# Patient Record
Sex: MALE
[De-identification: names, ages, dates, MRNs, and addresses within clinical notes are randomized per-mention and may not be internally consistent; named-entity substitution may affect disease eponyms.]

---

## 2022-05-11 PROBLEM — Z00.00 ENCOUNTER FOR PREVENTIVE HEALTH EXAMINATION: Status: ACTIVE | Noted: 2022-05-11

## 2022-05-12 ENCOUNTER — APPOINTMENT (OUTPATIENT)
Dept: ORTHOPEDIC SURGERY | Facility: CLINIC | Age: 62
End: 2022-05-12
Payer: OTHER MISCELLANEOUS

## 2022-05-12 VITALS — WEIGHT: 250 LBS | HEIGHT: 71 IN | BODY MASS INDEX: 35 KG/M2

## 2022-05-12 PROCEDURE — 99072 ADDL SUPL MATRL&STAF TM PHE: CPT

## 2022-05-12 PROCEDURE — 99213 OFFICE O/P EST LOW 20 MIN: CPT

## 2022-05-12 NOTE — DISCUSSION/SUMMARY
[de-identified] : Awaiting auth for visco injection.\par He is a chronic opioid user, has been unable to find an outside pain management so far. Will renew Rx one last time today, and he was given a list of outside referrals.

## 2022-05-12 NOTE — PHYSICAL EXAM
[Right] : right foot and ankle [NL (40)] : plantar flexion 40 degrees [NL 30)] : inversion 30 degrees [NL (20)] : eversion 20 degrees [5___] : eversion 5[unfilled]/5 [2+] : dorsalis pedis pulse: 2+ [] : no lisfranc's joint tenderness [TWNoteComboBox7] : dorsiflexion 15 degrees

## 2022-05-12 NOTE — HISTORY OF PRESENT ILLNESS
[Work related] : work related [7] : 7 [4] : 4 [Dull/Aching] : dull/aching [Localized] : localized [Leisure] : leisure [Social interactions] : social interactions [Rest] : rest [Sitting] : sitting [Standing] : standing [Walking] : walking [Stairs] : stairs [de-identified] : WC 6/11/14 R ankle twisted @ work. [facility] now on crutches. No prior injury\par 06/26/14: Pt here for f/u R ankle sprain. Continued pain, slight improvement. Swelling. Has not been elevating.\par 7/15/14 f/u R ankle nwb in cam boot\par 8/12/14 f/u R ankle nwb in cam boot\par 10/23/14: f/u R ankle. Ankle pain persists. Did discuss surgery at last visit but does not feel ready for this. Has been\par authorized for viscosupplementation of the R ankle. Working full time.\par 11/4/14: Supartz #1 RT ankle.\par 11/11/14: Supartz #2 R ankle.\par 11/18/14: Supartz #3 R ankle.\par 11/25/14: Supartz #4 R ankle.\par 12/2/14: Supartz #5 R ankle.\par 1/13/15 f/u R ankle considering rtw 1/19/15\par 1/14/16: f/u R ankle. still pain in the ankle. presents for [facility].\par 1/9/18 f/u R ankle pain w/activities Working\par 12/30/19 F/U R ankle Pain w/activities Working\par 5/12/22: F/U R ankle; pain worsening, wants to discuss inj. Requesting meds. Working.  [] : Post Surgical Visit: no [FreeTextEntry1] : R ankle [FreeTextEntry3] :  6/11/14 [FreeTextEntry9] : injections

## 2022-05-12 NOTE — WORK
[Are consistent with the injury] : are consistent with the injury [Consistent with my objective findings] : consistent with my objective findings [Does not reveal pre-existing condition(s) that may affect treatment/prognosis] : does not reveal pre-existing condition(s) that may affect treatment/prognosis [Can return to work without limitations on ______] : can return to work without limitations on [unfilled] [Rx may affect patient's ability to return to work, make patient drowsy, or other issue] : Rx may affect patient's ability to return to work, make patient drowsy, or other issue. [I provided the services listed above] :  I provided the services listed above.

## 2022-06-29 ENCOUNTER — FORM ENCOUNTER (OUTPATIENT)
Age: 62
End: 2022-06-29

## 2022-10-06 ENCOUNTER — APPOINTMENT (OUTPATIENT)
Dept: ORTHOPEDIC SURGERY | Facility: CLINIC | Age: 62
End: 2022-10-06

## 2022-10-28 ENCOUNTER — APPOINTMENT (OUTPATIENT)
Dept: ORTHOPEDIC SURGERY | Facility: CLINIC | Age: 62
End: 2022-10-28

## 2022-10-28 VITALS — WEIGHT: 250 LBS | HEIGHT: 71 IN | BODY MASS INDEX: 35 KG/M2

## 2022-10-28 DIAGNOSIS — Z78.9 OTHER SPECIFIED HEALTH STATUS: ICD-10-CM

## 2022-10-28 PROCEDURE — 99213 OFFICE O/P EST LOW 20 MIN: CPT | Mod: 25

## 2022-10-28 PROCEDURE — 20606 DRAIN/INJ JOINT/BURSA W/US: CPT | Mod: RT

## 2022-10-28 PROCEDURE — 99072 ADDL SUPL MATRL&STAF TM PHE: CPT

## 2022-10-28 NOTE — WORK
[Partial] : partial [Can return to work without limitations on ______] : can return to work without limitations on [unfilled] [Rx may affect patient's ability to return to work, make patient drowsy, or other issue] : Rx may affect patient's ability to return to work, make patient drowsy, or other issue. [I provided the services listed above] :  I provided the services listed above.

## 2022-10-28 NOTE — PROCEDURE
[Medium Joint Injection] : medium joint injection [Right] : of the right [Ankle Joint] : ankle joint [Pain] : pain [Inflammation] : inflammation [Repeat series performed] : repeat series performed [Alcohol] : alcohol [Ethyl Chloride sprayed topically] : ethyl chloride sprayed topically [Sterile technique used] : sterile technique used [Orthovisc] : Orthovisc [#1] : series #1 [Apply ice for 15min out of every hour for the next 12-24 hours as tolerated] : apply ice for 15 minutes out of every hour for the next 12-24 hours as tolerated

## 2022-10-28 NOTE — PHYSICAL EXAM
[Right] : right foot and ankle [NL (40)] : plantar flexion 40 degrees [NL 30)] : inversion 30 degrees [NL (20)] : eversion 20 degrees [5___] : eversion 5[unfilled]/5 [2+] : dorsalis pedis pulse: 2+ [] : no calf tenderness [TWNoteComboBox7] : dorsiflexion 15 degrees

## 2022-10-28 NOTE — HISTORY OF PRESENT ILLNESS
[Work related] : work related [7] : 7 [4] : 4 [Dull/Aching] : dull/aching [Localized] : localized [Leisure] : leisure [Social interactions] : social interactions [Rest] : rest [Sitting] : sitting [Standing] : standing [Walking] : walking [Stairs] : stairs [Full time] : Work status: full time [de-identified] : WC 6/11/14 R ankle twisted @ work. Seen @  [facility]ospital now on crutches. No prior injury\par 06/26/14: Pt here for f/u R ankle sprain. Continued pain, slight improvement. Swelling. Has not been elevating.\par 7/15/14 f/u R ankle nwb in cam boot\par 8/12/14 f/u R ankle nwb in cam boot\par 10/23/14: f/u R ankle. Ankle pain persists. Did discuss surgery at last visit but does not feel ready for this. Has been\par authorized for viscosupplementation of the R ankle. Working full time.\par 11/4/14: Supartz #1 RT ankle.\par 11/11/14: Supartz #2 R ankle.\par 11/18/14: Supartz #3 R ankle.\par 11/25/14: Supartz #4 R ankle.\par 12/2/14: Supartz #5 R ankle.\par 1/13/15 f/u R ankle considering rtw 1/19/15\par 1/14/16: f/u R ankle. still pain in the ankle. presents for [facility].\par 1/9/18 f/u R ankle pain w/activities Working\par 12/30/19 F/U R ankle Pain w/activities Working\par 5/12/22: F/U R ankle; pain worsening, wants to discuss inj. Requesting meds. Working. 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

## 2022-11-15 ENCOUNTER — APPOINTMENT (OUTPATIENT)
Dept: ORTHOPEDIC SURGERY | Facility: CLINIC | Age: 62
End: 2022-11-15

## 2022-11-15 PROCEDURE — 99212 OFFICE O/P EST SF 10 MIN: CPT | Mod: 25

## 2022-11-15 PROCEDURE — 20606 DRAIN/INJ JOINT/BURSA W/US: CPT | Mod: 50

## 2022-11-15 PROCEDURE — 99072 ADDL SUPL MATRL&STAF TM PHE: CPT

## 2022-11-15 NOTE — PROCEDURE
[Medium Joint Injection] : medium joint injection [Right] : of the right [Ankle Joint] : ankle joint [Pain] : pain [Inflammation] : inflammation [Repeat series performed] : repeat series performed [Alcohol] : alcohol [Ethyl Chloride sprayed topically] : ethyl chloride sprayed topically [Sterile technique used] : sterile technique used [Orthovisc] : Orthovisc [#2] : series #2 [Call if redness, pain or fever occur] : call if redness, pain or fever occur [Apply ice for 15min out of every hour for the next 12-24 hours as tolerated] : apply ice for 15 minutes out of every hour for the next 12-24 hours as tolerated [Patient was advised to rest the joint(s) for ____ days] : patient was advised to rest the joint(s) for [unfilled] days [Previous OTC use and PT nontherapeutic] : patient has tried OTC's including aspirin, Ibuprofen, Aleve, etc or prescription NSAIDS, and/or exercises at home and/or physical therapy without satisfactory response [Patient had decreased mobility in the joint] : patient had decreased mobility in the joint [Risks, benefits, alternatives discussed / Verbal consent obtained] : the risks benefits, and alternatives have been discussed, and verbal consent was obtained [All ultrasound images have been permanently captured and stored accordingly in our picture archiving and communication system] : All ultrasound images have been permanently captured and stored accordingly in our picture archiving and communication system [Visualization of the needle and placement of injection was performed without complication] : visualization of the needle and placement of injection was performed without complication

## 2022-11-15 NOTE — HISTORY OF PRESENT ILLNESS
[Work related] : work related [7] : 7 [4] : 4 [Dull/Aching] : dull/aching [Localized] : localized [Leisure] : leisure [Social interactions] : social interactions [Rest] : rest [Sitting] : sitting [Standing] : standing [Walking] : walking [Stairs] : stairs [Full time] : Work status: full time [de-identified] : WC 6/11/14 R ankle twisted @ work. Seen @  [facility]ospital now on crutches. No prior injury\par 06/26/14: Pt here for f/u R ankle sprain. Continued pain, slight improvement. Swelling. Has not been elevating.\par 7/15/14 f/u R ankle nwb in cam boot\par 8/12/14 f/u R ankle nwb in cam boot\par 10/23/14: f/u R ankle. Ankle pain persists. Did discuss surgery at last visit but does not feel ready for this. Has been\par authorized for viscosupplementation of the R ankle. Working full time.\par 11/4/14: Supartz #1 RT ankle.\par 11/11/14: Supartz #2 R ankle.\par 11/18/14: Supartz #3 R ankle.\par 11/25/14: Supartz #4 R ankle.\par 12/2/14: Supartz #5 R ankle.\par 1/13/15 f/u R ankle considering rtw 1/19/15\par 1/14/16: f/u R ankle. still pain in the ankle. presents for [facility].\par 1/9/18 f/u R ankle pain w/activities Working\par 12/30/19 F/U R ankle Pain w/activities Working\par 5/12/22: F/U R ankle; pain worsening, wants to discuss inj. Requesting meds. Working. 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

## 2022-11-29 ENCOUNTER — APPOINTMENT (OUTPATIENT)
Dept: ORTHOPEDIC SURGERY | Facility: CLINIC | Age: 62
End: 2022-11-29

## 2022-11-29 PROCEDURE — 99072 ADDL SUPL MATRL&STAF TM PHE: CPT

## 2022-11-29 PROCEDURE — 20606 DRAIN/INJ JOINT/BURSA W/US: CPT | Mod: RT

## 2022-11-29 PROCEDURE — 99213 OFFICE O/P EST LOW 20 MIN: CPT | Mod: 25

## 2022-11-29 NOTE — PROCEDURE
[Medium Joint Injection] : medium joint injection [Right] : of the right [Ankle Joint] : ankle joint [Pain] : pain [Inflammation] : inflammation [Repeat series performed] : repeat series performed [Alcohol] : alcohol [Ethyl Chloride sprayed topically] : ethyl chloride sprayed topically [Sterile technique used] : sterile technique used [Call if redness, pain or fever occur] : call if redness, pain or fever occur [Apply ice for 15min out of every hour for the next 12-24 hours as tolerated] : apply ice for 15 minutes out of every hour for the next 12-24 hours as tolerated [Patient was advised to rest the joint(s) for ____ days] : patient was advised to rest the joint(s) for [unfilled] days [Previous OTC use and PT nontherapeutic] : patient has tried OTC's including aspirin, Ibuprofen, Aleve, etc or prescription NSAIDS, and/or exercises at home and/or physical therapy without satisfactory response [Patient had decreased mobility in the joint] : patient had decreased mobility in the joint [Risks, benefits, alternatives discussed / Verbal consent obtained] : the risks benefits, and alternatives have been discussed, and verbal consent was obtained [All ultrasound images have been permanently captured and stored accordingly in our picture archiving and communication system] : All ultrasound images have been permanently captured and stored accordingly in our picture archiving and communication system [Visualization of the needle and placement of injection was performed without complication] : visualization of the needle and placement of injection was performed without complication [Orthovisc (30mg)] : 30mg of Orthovisc [#4] : series #4

## 2022-11-29 NOTE — HISTORY OF PRESENT ILLNESS
[Work related] : work related [7] : 7 [4] : 4 [Dull/Aching] : dull/aching [Localized] : localized [Leisure] : leisure [Social interactions] : social interactions [Rest] : rest [Sitting] : sitting [Standing] : standing [Walking] : walking [Stairs] : stairs [Full time] : Work status: full time [de-identified] : WC 6/11/14 R ankle twisted @ work. Seen @  Hospital now on crutches. No prior injury\par 06/26/14: Pt here for f/u R ankle sprain. Continued pain, slight improvement. Swelling. Has not been elevating.\par 7/15/14 f/u R ankle nwb in cam boot\par 8/12/14 f/u R ankle nwb in cam boot\par 10/23/14: f/u R ankle. Ankle pain persists. Did discuss surgery at last visit but does not feel ready for this. Has been\par authorized for viscosupplementation of the R ankle. Working full time.\par 11/4/14: Supartz #1 RT ankle.\par 11/11/14: Supartz #2 R ankle.\par 11/18/14: Supartz #3 R ankle.\par 11/25/14: Supartz #4 R ankle.\par 12/2/14: Supartz #5 R ankle.\par 1/13/15 f/u R ankle considering rtw 1/19/15\par 1/14/16: f/u R ankle. still pain in the ankle. presents for [facility].\par 1/9/18 f/u R ankle pain w/activities Working\par 12/30/19 F/U R ankle Pain w/activities Working\par 5/12/22: F/U R ankle; pain worsening, wants to discuss inj. Requesting meds. Working. 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

## 2022-12-19 ENCOUNTER — FORM ENCOUNTER (OUTPATIENT)
Age: 62
End: 2022-12-19

## 2023-01-15 ENCOUNTER — FORM ENCOUNTER (OUTPATIENT)
Age: 63
End: 2023-01-15

## 2023-02-20 ENCOUNTER — FORM ENCOUNTER (OUTPATIENT)
Age: 63
End: 2023-02-20

## 2023-03-27 ENCOUNTER — FORM ENCOUNTER (OUTPATIENT)
Age: 63
End: 2023-03-27

## 2023-04-18 ENCOUNTER — APPOINTMENT (OUTPATIENT)
Dept: ORTHOPEDIC SURGERY | Facility: CLINIC | Age: 63
End: 2023-04-18
Payer: OTHER MISCELLANEOUS

## 2023-04-18 PROCEDURE — 99213 OFFICE O/P EST LOW 20 MIN: CPT

## 2023-04-18 NOTE — HISTORY OF PRESENT ILLNESS
[Work related] : work related [7] : 7 [4] : 4 [Dull/Aching] : dull/aching [Localized] : localized [Leisure] : leisure [Social interactions] : social interactions [Rest] : rest [Sitting] : sitting [Standing] : standing [Walking] : walking [Stairs] : stairs [Full time] : Work status: full time [de-identified] : WC 6/11/14 R ankle twisted @ work. Seen @  [facility]ospital now on crutches. No prior injury\par 06/26/14: Pt here for f/u R ankle sprain. Continued pain, slight improvement. Swelling. Has not been elevating.\par 7/15/14 f/u R ankle nwb in cam boot\par 8/12/14 f/u R ankle nwb in cam boot\par 10/23/14: f/u R ankle. Ankle pain persists. Did discuss surgery at last visit but does not feel ready for this. Has been\par authorized for viscosupplementation of the R ankle. Working full time.\par 11/4/14: Supartz #1 RT ankle.\par 11/11/14: Supartz #2 R ankle.\par 11/18/14: Supartz #3 R ankle.\par 11/25/14: Supartz #4 R ankle.\par 12/2/14: Supartz #5 R ankle.\par 1/13/15 f/u R ankle considering rtw 1/19/15\par 1/14/16: f/u R ankle. still pain in the ankle. Presents for [facility].\par 1/9/18 f/u R ankle pain w/activities Working\par 12/30/19 F/U R ankle Pain w/activities Working\par 5/12/22: F/U R ankle; pain worsening, wants to discuss inj. Requesting meds. Working. \par 10/28/22: fu R ankle Orthovisc #1  Working requ meds\par 11/15/22: F/U R ankle Orthovisc #2\par 11/22/22  Orthovisc #3 R ankle\par 11/29/22: f/u R ankle Orthovisc #4\par \par 4/18/23: F/U R ankle- Reports feeling worse with new knee pain. No recent trauma.  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

## 2023-05-10 ENCOUNTER — FORM ENCOUNTER (OUTPATIENT)
Age: 63
End: 2023-05-10

## 2023-06-11 ENCOUNTER — FORM ENCOUNTER (OUTPATIENT)
Age: 63
End: 2023-06-11

## 2023-06-14 ENCOUNTER — FORM ENCOUNTER (OUTPATIENT)
Age: 63
End: 2023-06-14

## 2023-07-05 ENCOUNTER — FORM ENCOUNTER (OUTPATIENT)
Age: 63
End: 2023-07-05

## 2023-09-07 ENCOUNTER — APPOINTMENT (OUTPATIENT)
Dept: ORTHOPEDIC SURGERY | Facility: CLINIC | Age: 63
End: 2023-09-07
Payer: OTHER MISCELLANEOUS

## 2023-09-07 DIAGNOSIS — S46.011S STRAIN OF MUSCLE(S) AND TENDON(S) OF THE ROTATOR CUFF OF RIGHT SHOULDER, SEQUELA: ICD-10-CM

## 2023-09-07 DIAGNOSIS — M25.811 OTHER SPECIFIED JOINT DISORDERS, RIGHT SHOULDER: ICD-10-CM

## 2023-09-07 PROCEDURE — 99213 OFFICE O/P EST LOW 20 MIN: CPT

## 2023-09-07 PROCEDURE — 73030 X-RAY EXAM OF SHOULDER: CPT | Mod: RT

## 2023-09-07 NOTE — HISTORY OF PRESENT ILLNESS
[9] : 9 [Dull/Aching] : dull/aching [de-identified] :  DOI 4/19/18 4/24/18: R shoulder and low back pain after fall down the steps at work. Pain with movement of the shoulder. Right sided low back pain, no radicular complaints. No numbness/tingling. No prior injuries. OOW 5/15/18: f/u R shoulder, continued pain. Here to review the MRI. 6/12/18 f/u R shoulder HEP OOW 7/2/18: f/u R shoulder, HEP requesting meds 9/4/18: f/u R shoulder; continued pain. requesting meds 11/20/18 f/u R shoulder HEP Working reg 2/22/19 f/u R shoulder pain w/overhead activities HEP 3/28/19: f/u R shoulder, continued pain, HEP, requesting meds. working 7/30/19 f/u R shoulder HEP, PT not authorized working requ meds 10/1/19 f/u R shoulder HEP, working 7/16/20: f/u R shoulder, continued pain, +night pain and difficulty raising the arm. requesting meds. 6/10/21 f/u R shoulder worsening pain, requ injection 6/29/21 f/u right shoulder after MRI. Pain is worsening. No relief from CSI. Requesting meds 10/7/21: f/u R shoulder, continued pain. requesting meds. Hx covid 8/2021 9/7/23: f/u R shoulder pain and stiffness, pops  Requ meds  Working  [FreeTextEntry1] : Right shoulder

## 2023-09-07 NOTE — PHYSICAL EXAM
[Right] : right shoulder [Sitting] : sitting [] : negative drop-arm test [TWNoteComboBox7] : active forward flexion 140 degrees [de-identified] : active abduction 110 degrees [de-identified] : external rotation 45 degrees

## 2023-09-07 NOTE — DISCUSSION/SUMMARY
[Medication Risks Reviewed] : Medication risks reviewed [de-identified] : Repeat request auth for PT meds renewed with caution

## 2024-02-06 ENCOUNTER — APPOINTMENT (OUTPATIENT)
Dept: ORTHOPEDIC SURGERY | Facility: CLINIC | Age: 64
End: 2024-02-06
Payer: OTHER MISCELLANEOUS

## 2024-02-06 DIAGNOSIS — M79.671 PAIN IN RIGHT FOOT: ICD-10-CM

## 2024-02-06 PROCEDURE — 73650 X-RAY EXAM OF HEEL: CPT | Mod: RT

## 2024-02-06 PROCEDURE — 99213 OFFICE O/P EST LOW 20 MIN: CPT

## 2024-02-06 NOTE — HISTORY OF PRESENT ILLNESS
[9] : 9 [Dull/Aching] : dull/aching [de-identified] : WC 6/11/14  R ankle  2/6/24  Increasing R heel pain.  Working  Using cane  [FreeTextEntry1] : Right shoulder

## 2024-02-06 NOTE — PHYSICAL EXAM
[Right] : right foot and ankle [TWNoteComboBox7] : active forward flexion 140 degrees [de-identified] : active abduction 110 degrees [de-identified] : external rotation 45 degrees [] : no achilles tendon insertion tenderness

## 2024-02-06 NOTE — DISCUSSION/SUMMARY
[Medication Risks Reviewed] : Medication risks reviewed [de-identified] : Lidodeerm patch, NSAIDs ice HEP

## 2024-02-10 ENCOUNTER — RX CHANGE (OUTPATIENT)
Age: 64
End: 2024-02-10

## 2024-02-10 RX ORDER — LIDOCAINE 5% 700 MG/1
5 PATCH TOPICAL
Qty: 20 | Refills: 3 | Status: ACTIVE | COMMUNITY
Start: 2024-02-06 | End: 1900-01-01

## 2024-06-13 ENCOUNTER — APPOINTMENT (OUTPATIENT)
Dept: ORTHOPEDIC SURGERY | Facility: CLINIC | Age: 64
End: 2024-06-13
Payer: OTHER MISCELLANEOUS

## 2024-06-13 VITALS — WEIGHT: 250 LBS | HEIGHT: 71 IN | BODY MASS INDEX: 35 KG/M2

## 2024-06-13 DIAGNOSIS — M93.271 OSTEOCHONDRITIS DISSECANS, RIGHT ANKLE AND JOINTS OF RIGHT FOOT: ICD-10-CM

## 2024-06-13 PROCEDURE — 99213 OFFICE O/P EST LOW 20 MIN: CPT

## 2024-06-13 RX ORDER — OXYCODONE AND ACETAMINOPHEN 5; 325 MG/1; MG/1
5-325 TABLET ORAL
Qty: 28 | Refills: 0 | Status: ACTIVE | COMMUNITY
Start: 2022-05-12 | End: 1900-01-01

## 2024-06-13 NOTE — DISCUSSION/SUMMARY
[Medication Risks Reviewed] : Medication risks reviewed [de-identified] : Brace prn ice HEP  Med R/n

## 2024-06-13 NOTE — HISTORY OF PRESENT ILLNESS
[9] : 9 [Dull/Aching] : dull/aching [de-identified] :  6/11/14  R ankle  Reason for Visit 3. Follow up  Patient Complaint -  6/11/14 R ankle twisted @ work. Seen @ [facility] now on crutches. No prior injury 06/26/14: Pt here for f/u R ankle sprain. Continued pain, slight improvement. Swelling. Has not been elevating. 7/15/14 f/u R ankle nwb in cam boot 8/12/14 f/u R ankle nwb in cam boot 10/23/14: f/u R ankle. Ankle pain persists. Did discuss surgery at last visit but does not feel ready for this. Has been authorized for viscosupplementation of the R ankle. Working full time. 11/4/14: Supartz #1 RT ankle. 11/11/14: Supartz #2 R ankle. 11/18/14: Supartz #3 R ankle. 11/25/14: Supartz #4 R ankle. 12/2/14: Supartz #5 R ankle. 1/13/15 f/u R ankle considering rtw 1/19/15 1/14/16: f/u R ankle. still pain in the ankle. presents for [facility]. 1/9/18 f/u R ankle pain w/activities Working 12/30/19 F/U R ankle Pain w/activities Working    2/6/24  Increasing R heel pain.  Working  Using cane 6/13/24  f/u R ankle  Used cbd oil  Working  [FreeTextEntry1] : Right shoulder

## 2024-06-13 NOTE — PHYSICAL EXAM
[Right] : right foot and ankle [TWNoteComboBox7] : active forward flexion 140 degrees [de-identified] : active abduction 110 degrees [de-identified] : external rotation 45 degrees [] : no achilles tendon insertion tenderness

## 2025-09-12 ENCOUNTER — APPOINTMENT (OUTPATIENT)
Dept: ORTHOPEDIC SURGERY | Facility: CLINIC | Age: 65
End: 2025-09-12
Payer: OTHER MISCELLANEOUS

## 2025-09-12 DIAGNOSIS — M93.271 OSTEOCHONDRITIS DISSECANS, RIGHT ANKLE AND JOINTS OF RIGHT FOOT: ICD-10-CM

## 2025-09-12 PROCEDURE — 99213 OFFICE O/P EST LOW 20 MIN: CPT

## 2025-09-13 ENCOUNTER — APPOINTMENT (OUTPATIENT)
Dept: ORTHOPEDIC SURGERY | Facility: CLINIC | Age: 65
End: 2025-09-13
Payer: OTHER MISCELLANEOUS

## 2025-09-13 DIAGNOSIS — M48.062 SPINAL STENOSIS, LUMBAR REGION WITH NEUROGENIC CLAUDICATION: ICD-10-CM

## 2025-09-13 DIAGNOSIS — M79.18 MYALGIA, OTHER SITE: ICD-10-CM

## 2025-09-13 DIAGNOSIS — M54.16 RADICULOPATHY, LUMBAR REGION: ICD-10-CM

## 2025-09-13 PROCEDURE — 72110 X-RAY EXAM L-2 SPINE 4/>VWS: CPT

## 2025-09-13 PROCEDURE — 99244 OFF/OP CNSLTJ NEW/EST MOD 40: CPT

## 2025-09-13 PROCEDURE — 72170 X-RAY EXAM OF PELVIS: CPT

## 2025-09-13 RX ORDER — METHYLPREDNISOLONE 4 MG/1
4 TABLET ORAL
Qty: 1 | Refills: 0 | Status: ACTIVE | COMMUNITY
Start: 2025-09-13 | End: 1900-01-01